# Patient Record
Sex: MALE | Race: OTHER | Employment: OTHER | URBAN - METROPOLITAN AREA
[De-identification: names, ages, dates, MRNs, and addresses within clinical notes are randomized per-mention and may not be internally consistent; named-entity substitution may affect disease eponyms.]

---

## 2019-06-07 NOTE — PATIENT DISCUSSION
EXPLAINED THAT CORNEAL REFRACTIVE SX WOULD BE SHORT LIVED BECAUSE THE PRESCRIPTION WILL CONTINUE TO CHANGE AS THE CATARACTS PROGRESS. RECOMMEND RLE OVER LASIK TO PROVIDE MORE LONGTERM QUALITY OF VA.

## 2019-06-07 NOTE — PATIENT DISCUSSION
CATARACTS, OU- NOT VISUALLY SIGNIFICANT. DISC OPT OF GLS/HGNM-GC-PDUEKC-VS-REFRACTIVE SX TO REDUCE DEPENDENCY ON GLS.

## 2022-12-07 ENCOUNTER — NEW PATIENT (OUTPATIENT)
Dept: URBAN - METROPOLITAN AREA CLINIC 47 | Facility: CLINIC | Age: 67
End: 2022-12-07

## 2022-12-07 PROCEDURE — 92015 DETERMINE REFRACTIVE STATE: CPT

## 2022-12-07 PROCEDURE — 92004 COMPRE OPH EXAM NEW PT 1/>: CPT

## 2022-12-07 ASSESSMENT — VISUAL ACUITY
OD_CC: J1
OS_CC: 20/25-1
OD_CC: 20/20
OS_CC: J2

## 2022-12-07 ASSESSMENT — TONOMETRY
OS_IOP_MMHG: 12
OD_IOP_MMHG: 12

## 2023-12-28 ENCOUNTER — COMPREHENSIVE EXAM (OUTPATIENT)
Dept: URBAN - METROPOLITAN AREA CLINIC 47 | Facility: CLINIC | Age: 68
End: 2023-12-28

## 2023-12-28 DIAGNOSIS — H40.033: ICD-10-CM

## 2023-12-28 DIAGNOSIS — H25.813: ICD-10-CM

## 2023-12-28 PROCEDURE — 92132 CPTRZD OPH DX IMG ANT SGM: CPT

## 2023-12-28 PROCEDURE — 92014 COMPRE OPH EXAM EST PT 1/>: CPT

## 2023-12-28 PROCEDURE — 92015 DETERMINE REFRACTIVE STATE: CPT

## 2023-12-28 PROCEDURE — 92020 GONIOSCOPY: CPT

## 2023-12-28 ASSESSMENT — VISUAL ACUITY
OU_CC: J1
OD_CC: J1
OU_CC: 20/20
OD_CC: 20/20
OS_CC: 20/20
OS_CC: J1

## 2023-12-28 ASSESSMENT — TONOMETRY
OS_IOP_MMHG: 12
OD_IOP_MMHG: 12

## 2024-02-21 ENCOUNTER — SURGERY/PROCEDURE (OUTPATIENT)
Facility: LOCATION | Age: 69
End: 2024-02-21

## 2024-02-21 ENCOUNTER — CONSULTATION/EVALUATION (OUTPATIENT)
Facility: LOCATION | Age: 69
End: 2024-02-21

## 2024-02-21 DIAGNOSIS — H40.033: ICD-10-CM

## 2024-02-21 DIAGNOSIS — H40.023: ICD-10-CM

## 2024-02-21 PROCEDURE — 92020 GONIOSCOPY: CPT

## 2024-02-21 PROCEDURE — 6676150 BILATERAL LASER IRIDOTOMY

## 2024-02-21 PROCEDURE — 99204 OFFICE O/P NEW MOD 45 MIN: CPT

## 2024-02-21 ASSESSMENT — VISUAL ACUITY
OS_CC: 20/20
OD_CC: J1
OS_CC: J1
OD_CC: 20/20-1

## 2024-02-21 ASSESSMENT — TONOMETRY
OS_IOP_MMHG: 12
OD_IOP_MMHG: 11

## 2024-04-02 ENCOUNTER — FOLLOW UP (OUTPATIENT)
Dept: URBAN - METROPOLITAN AREA CLINIC 43 | Facility: CLINIC | Age: 69
End: 2024-04-02

## 2024-04-02 DIAGNOSIS — H40.013: ICD-10-CM

## 2024-04-02 DIAGNOSIS — H40.033: ICD-10-CM

## 2024-04-02 PROCEDURE — 92020 GONIOSCOPY: CPT

## 2024-04-02 PROCEDURE — 92132 CPTRZD OPH DX IMG ANT SGM: CPT

## 2024-04-02 PROCEDURE — 92250 FUNDUS PHOTOGRAPHY W/I&R: CPT

## 2024-04-02 PROCEDURE — 92012 INTRM OPH EXAM EST PATIENT: CPT

## 2024-04-02 ASSESSMENT — VISUAL ACUITY
OD_CC: 20/20-1
OS_CC: 20/20-1

## 2024-04-02 ASSESSMENT — TONOMETRY
OS_IOP_MMHG: 11
OD_IOP_MMHG: 12

## 2025-01-13 ENCOUNTER — COMPREHENSIVE EXAM (OUTPATIENT)
Age: 70
End: 2025-01-13

## 2025-01-13 DIAGNOSIS — H40.033: ICD-10-CM

## 2025-01-13 DIAGNOSIS — H04.123: ICD-10-CM

## 2025-01-13 DIAGNOSIS — H25.813: ICD-10-CM

## 2025-01-13 DIAGNOSIS — H52.7: ICD-10-CM

## 2025-01-13 DIAGNOSIS — H40.013: ICD-10-CM

## 2025-01-13 PROCEDURE — 92015 DETERMINE REFRACTIVE STATE: CPT

## 2025-01-13 PROCEDURE — 92014 COMPRE OPH EXAM EST PT 1/>: CPT
